# Patient Record
Sex: MALE | Race: ASIAN | NOT HISPANIC OR LATINO | Employment: FULL TIME | URBAN - METROPOLITAN AREA
[De-identification: names, ages, dates, MRNs, and addresses within clinical notes are randomized per-mention and may not be internally consistent; named-entity substitution may affect disease eponyms.]

---

## 2017-03-03 ENCOUNTER — APPOINTMENT (OUTPATIENT)
Dept: RADIOLOGY | Facility: MEDICAL CENTER | Age: 62
End: 2017-03-03
Attending: EMERGENCY MEDICINE
Payer: COMMERCIAL

## 2017-03-03 ENCOUNTER — HOSPITAL ENCOUNTER (OUTPATIENT)
Facility: MEDICAL CENTER | Age: 62
End: 2017-03-04
Attending: EMERGENCY MEDICINE | Admitting: INTERNAL MEDICINE
Payer: COMMERCIAL

## 2017-03-03 ENCOUNTER — RESOLUTE PROFESSIONAL BILLING HOSPITAL PROF FEE (OUTPATIENT)
Dept: MEDSURG UNIT | Facility: MEDICAL CENTER | Age: 62
End: 2017-03-03
Payer: COMMERCIAL

## 2017-03-03 DIAGNOSIS — R47.01 APHASIA: ICD-10-CM

## 2017-03-03 DIAGNOSIS — G45.8 OTHER SPECIFIED TRANSIENT CEREBRAL ISCHEMIAS: ICD-10-CM

## 2017-03-03 PROBLEM — G45.9 TIA (TRANSIENT ISCHEMIC ATTACK): Status: ACTIVE | Noted: 2017-03-03

## 2017-03-03 LAB
ABO GROUP BLD: NORMAL
ABO GROUP BLD: NORMAL
ALBUMIN SERPL BCP-MCNC: 4.2 G/DL (ref 3.2–4.9)
ALBUMIN/GLOB SERPL: 1.4 G/DL
ALP SERPL-CCNC: 65 U/L (ref 30–99)
ALT SERPL-CCNC: 20 U/L (ref 2–50)
ANION GAP SERPL CALC-SCNC: 6 MMOL/L (ref 0–11.9)
APPEARANCE UR: CLEAR
APTT PPP: 26.2 SEC (ref 24.7–36)
AST SERPL-CCNC: 21 U/L (ref 12–45)
BASOPHILS # BLD AUTO: 0.9 % (ref 0–1.8)
BASOPHILS # BLD: 0.06 K/UL (ref 0–0.12)
BILIRUB SERPL-MCNC: 0.6 MG/DL (ref 0.1–1.5)
BILIRUB UR QL STRIP.AUTO: NEGATIVE
BLD GP AB SCN SERPL QL: NORMAL
BUN SERPL-MCNC: 19 MG/DL (ref 8–22)
CALCIUM SERPL-MCNC: 9.7 MG/DL (ref 8.5–10.5)
CHLORIDE SERPL-SCNC: 106 MMOL/L (ref 96–112)
CO2 SERPL-SCNC: 29 MMOL/L (ref 20–33)
COLOR UR: COLORLESS
CREAT SERPL-MCNC: 1.13 MG/DL (ref 0.5–1.4)
CULTURE IF INDICATED INDCX: NO UA CULTURE
EKG IMPRESSION: NORMAL
EOSINOPHIL # BLD AUTO: 0.21 K/UL (ref 0–0.51)
EOSINOPHIL NFR BLD: 3.1 % (ref 0–6.9)
ERYTHROCYTE [DISTWIDTH] IN BLOOD BY AUTOMATED COUNT: 38.1 FL (ref 35.9–50)
GFR SERPL CREATININE-BSD FRML MDRD: >60 ML/MIN/1.73 M 2
GLOBULIN SER CALC-MCNC: 2.9 G/DL (ref 1.9–3.5)
GLUCOSE SERPL-MCNC: 93 MG/DL (ref 65–99)
GLUCOSE UR STRIP.AUTO-MCNC: NEGATIVE MG/DL
HCT VFR BLD AUTO: 45.6 % (ref 42–52)
HGB BLD-MCNC: 15.3 G/DL (ref 14–18)
IMM GRANULOCYTES # BLD AUTO: 0.02 K/UL (ref 0–0.11)
IMM GRANULOCYTES NFR BLD AUTO: 0.3 % (ref 0–0.9)
INR PPP: 0.94 (ref 0.87–1.13)
KETONES UR STRIP.AUTO-MCNC: NEGATIVE MG/DL
LEUKOCYTE ESTERASE UR QL STRIP.AUTO: NEGATIVE
LYMPHOCYTES # BLD AUTO: 1.84 K/UL (ref 1–4.8)
LYMPHOCYTES NFR BLD: 26.8 % (ref 22–41)
MCH RBC QN AUTO: 28.1 PG (ref 27–33)
MCHC RBC AUTO-ENTMCNC: 33.6 G/DL (ref 33.7–35.3)
MCV RBC AUTO: 83.7 FL (ref 81.4–97.8)
MICRO URNS: NORMAL
MONOCYTES # BLD AUTO: 0.63 K/UL (ref 0–0.85)
MONOCYTES NFR BLD AUTO: 9.2 % (ref 0–13.4)
NEUTROPHILS # BLD AUTO: 4.11 K/UL (ref 1.82–7.42)
NEUTROPHILS NFR BLD: 59.7 % (ref 44–72)
NITRITE UR QL STRIP.AUTO: NEGATIVE
NRBC # BLD AUTO: 0 K/UL
NRBC BLD AUTO-RTO: 0 /100 WBC
PH UR STRIP.AUTO: 7 [PH]
PLATELET # BLD AUTO: 247 K/UL (ref 164–446)
PMV BLD AUTO: 9.2 FL (ref 9–12.9)
POTASSIUM SERPL-SCNC: 4 MMOL/L (ref 3.6–5.5)
PROT SERPL-MCNC: 7.1 G/DL (ref 6–8.2)
PROT UR QL STRIP: NEGATIVE MG/DL
PROTHROMBIN TIME: 12.9 SEC (ref 12–14.6)
RBC # BLD AUTO: 5.45 M/UL (ref 4.7–6.1)
RBC UR QL AUTO: NEGATIVE
RH BLD: NORMAL
SODIUM SERPL-SCNC: 141 MMOL/L (ref 135–145)
SP GR UR STRIP.AUTO: 1.01
TROPONIN I SERPL-MCNC: <0.01 NG/ML (ref 0–0.04)
WBC # BLD AUTO: 6.9 K/UL (ref 4.8–10.8)

## 2017-03-03 PROCEDURE — 700102 HCHG RX REV CODE 250 W/ 637 OVERRIDE(OP): Performed by: INTERNAL MEDICINE

## 2017-03-03 PROCEDURE — 86850 RBC ANTIBODY SCREEN: CPT

## 2017-03-03 PROCEDURE — 700105 HCHG RX REV CODE 258: Performed by: INTERNAL MEDICINE

## 2017-03-03 PROCEDURE — 70496 CT ANGIOGRAPHY HEAD: CPT

## 2017-03-03 PROCEDURE — 99285 EMERGENCY DEPT VISIT HI MDM: CPT

## 2017-03-03 PROCEDURE — 93005 ELECTROCARDIOGRAM TRACING: CPT | Performed by: EMERGENCY MEDICINE

## 2017-03-03 PROCEDURE — 36415 COLL VENOUS BLD VENIPUNCTURE: CPT

## 2017-03-03 PROCEDURE — 70498 CT ANGIOGRAPHY NECK: CPT

## 2017-03-03 PROCEDURE — 700117 HCHG RX CONTRAST REV CODE 255: Performed by: EMERGENCY MEDICINE

## 2017-03-03 PROCEDURE — 94760 N-INVAS EAR/PLS OXIMETRY 1: CPT

## 2017-03-03 PROCEDURE — 85610 PROTHROMBIN TIME: CPT

## 2017-03-03 PROCEDURE — 80053 COMPREHEN METABOLIC PANEL: CPT

## 2017-03-03 PROCEDURE — 86900 BLOOD TYPING SEROLOGIC ABO: CPT

## 2017-03-03 PROCEDURE — G0378 HOSPITAL OBSERVATION PER HR: HCPCS

## 2017-03-03 PROCEDURE — A9270 NON-COVERED ITEM OR SERVICE: HCPCS | Performed by: INTERNAL MEDICINE

## 2017-03-03 PROCEDURE — 81003 URINALYSIS AUTO W/O SCOPE: CPT

## 2017-03-03 PROCEDURE — 86901 BLOOD TYPING SEROLOGIC RH(D): CPT

## 2017-03-03 PROCEDURE — 85730 THROMBOPLASTIN TIME PARTIAL: CPT

## 2017-03-03 PROCEDURE — 84484 ASSAY OF TROPONIN QUANT: CPT

## 2017-03-03 PROCEDURE — 85025 COMPLETE CBC W/AUTO DIFF WBC: CPT

## 2017-03-03 PROCEDURE — 96360 HYDRATION IV INFUSION INIT: CPT

## 2017-03-03 PROCEDURE — 70450 CT HEAD/BRAIN W/O DYE: CPT

## 2017-03-03 PROCEDURE — 71010 DX-CHEST-PORTABLE (1 VIEW): CPT

## 2017-03-03 RX ORDER — POLYETHYLENE GLYCOL 3350 17 G/17G
1 POWDER, FOR SOLUTION ORAL
Status: DISCONTINUED | OUTPATIENT
Start: 2017-03-03 | End: 2017-03-04 | Stop reason: HOSPADM

## 2017-03-03 RX ORDER — IBUPROFEN 200 MG
950 CAPSULE ORAL DAILY
COMMUNITY

## 2017-03-03 RX ORDER — M-VIT,TX,IRON,MINS/CALC/FOLIC 27MG-0.4MG
1 TABLET ORAL DAILY
Status: DISCONTINUED | OUTPATIENT
Start: 2017-03-04 | End: 2017-03-04 | Stop reason: HOSPADM

## 2017-03-03 RX ORDER — SODIUM CHLORIDE 9 MG/ML
INJECTION, SOLUTION INTRAVENOUS CONTINUOUS
Status: DISCONTINUED | OUTPATIENT
Start: 2017-03-03 | End: 2017-03-04 | Stop reason: HOSPADM

## 2017-03-03 RX ORDER — ATORVASTATIN CALCIUM 20 MG/1
40 TABLET, FILM COATED ORAL EVERY MORNING
Status: DISCONTINUED | OUTPATIENT
Start: 2017-03-04 | End: 2017-03-03

## 2017-03-03 RX ORDER — ASPIRIN 300 MG/1
325 SUPPOSITORY RECTAL ONCE
Status: DISCONTINUED | OUTPATIENT
Start: 2017-03-03 | End: 2017-03-03

## 2017-03-03 RX ORDER — BISACODYL 10 MG
10 SUPPOSITORY, RECTAL RECTAL
Status: DISCONTINUED | OUTPATIENT
Start: 2017-03-03 | End: 2017-03-04 | Stop reason: HOSPADM

## 2017-03-03 RX ORDER — LOSARTAN POTASSIUM 50 MG/1
50 TABLET ORAL EVERY MORNING
Status: DISCONTINUED | OUTPATIENT
Start: 2017-03-04 | End: 2017-03-03

## 2017-03-03 RX ORDER — LOSARTAN POTASSIUM 50 MG/1
50 TABLET ORAL EVERY MORNING
COMMUNITY

## 2017-03-03 RX ORDER — AMOXICILLIN 250 MG
2 CAPSULE ORAL 2 TIMES DAILY
Status: DISCONTINUED | OUTPATIENT
Start: 2017-03-03 | End: 2017-03-04 | Stop reason: HOSPADM

## 2017-03-03 RX ORDER — IBUPROFEN 200 MG
950 CAPSULE ORAL DAILY
Status: DISCONTINUED | OUTPATIENT
Start: 2017-03-04 | End: 2017-03-04 | Stop reason: HOSPADM

## 2017-03-03 RX ORDER — MIRTAZAPINE 45 MG/1
45 TABLET, FILM COATED ORAL NIGHTLY
COMMUNITY

## 2017-03-03 RX ORDER — ASPIRIN 300 MG/1
300 SUPPOSITORY RECTAL ONCE
Status: COMPLETED | OUTPATIENT
Start: 2017-03-03 | End: 2017-03-03

## 2017-03-03 RX ORDER — ATORVASTATIN CALCIUM 80 MG/1
80 TABLET, FILM COATED ORAL
Status: DISCONTINUED | OUTPATIENT
Start: 2017-03-03 | End: 2017-03-04 | Stop reason: HOSPADM

## 2017-03-03 RX ORDER — ATORVASTATIN CALCIUM 40 MG/1
40 TABLET, FILM COATED ORAL EVERY MORNING
Status: ON HOLD | COMMUNITY
End: 2017-03-04

## 2017-03-03 RX ADMIN — ASPIRIN 300 MG: 300 SUPPOSITORY RECTAL at 22:26

## 2017-03-03 RX ADMIN — IOHEXOL 80 ML: 350 INJECTION, SOLUTION INTRAVENOUS at 19:51

## 2017-03-03 RX ADMIN — MIRTAZAPINE 45 MG: 15 TABLET, FILM COATED ORAL at 23:34

## 2017-03-03 RX ADMIN — ATORVASTATIN CALCIUM 80 MG: 80 TABLET, FILM COATED ORAL at 23:34

## 2017-03-03 RX ADMIN — SODIUM CHLORIDE: 9 INJECTION, SOLUTION INTRAVENOUS at 23:38

## 2017-03-03 ASSESSMENT — ENCOUNTER SYMPTOMS
MYALGIAS: 0
FEVER: 0
BLURRED VISION: 0
PALPITATIONS: 0
DIARRHEA: 0
COUGH: 0
SHORTNESS OF BREATH: 0
VOMITING: 0
SENSORY CHANGE: 0
CHILLS: 0
DIZZINESS: 0
SPEECH CHANGE: 1
ABDOMINAL PAIN: 0
HEADACHES: 1
NERVOUS/ANXIOUS: 1
FOCAL WEAKNESS: 0
LOSS OF CONSCIOUSNESS: 0
NAUSEA: 0

## 2017-03-03 ASSESSMENT — LIFESTYLE VARIABLES
ALCOHOL_USE: NO
SUBSTANCE_ABUSE: 0
EVER_SMOKED: NEVER
DO YOU DRINK ALCOHOL: NO

## 2017-03-03 ASSESSMENT — PAIN SCALES - GENERAL
PAINLEVEL_OUTOF10: 0
PAINLEVEL_OUTOF10: 0

## 2017-03-03 NOTE — IP AVS SNAPSHOT
" Home Care Instructions                                                                                                                  Name:Rosalee Santamaria  Medical Record Number:[id number]  CSN: [id number]    YOB: 1955   Age: 61 y.o.  Sex: male  HT:1.854 m (6' 1\") WT: 76.2 kg (167 lb 15.9 oz)          Admit Date: 3/3/2017     Discharge Date:   Today's Date: 3/4/2017  Attending Doctor:  No att. providers found                  Allergies:  Review of patient's allergies indicates no known allergies.            Discharge Instructions       Discharge Instructions    Discharged to home by car with relative. Discharged via wheelchair, hospital escort: Refused.  Special equipment needed: Not Applicable    Be sure to schedule a follow-up appointment with your primary care doctor or any specialists as instructed.     Discharge Plan:   Diet Plan: Discussed  Activity Level: Discussed  Confirmed Follow up Appointment: Patient to Call and Schedule Appointment  Confirmed Symptoms Management: Discussed  Medication Reconciliation Updated: Yes  Influenza Vaccine Indication: Not indicated: Previously immunized this influenza season and > 8 years of age    I understand that a diet low in cholesterol, fat, and sodium is recommended for good health. Unless I have been given specific instructions below for another diet, I accept this instruction as my diet prescription.   Other diet: heart healthy    Special Instructions: None    · Is patient discharged on Warfarin / Coumadin?   No     · Is patient Post Blood Transfusion?  No    Depression / Suicide Risk    As you are discharged from this [facility] facility, it is important to learn how to keep safe from harming yourself.    Recognize the warning signs:  · Abrupt changes in personality, positive or negative- including increase in energy   · Giving away possessions  · Change in eating patterns- significant weight changes-  positive or negative  · Change in sleeping " patterns- unable to sleep or sleeping all the time   · Unwillingness or inability to communicate  · Depression  · Unusual sadness, discouragement and loneliness  · Talk of wanting to die  · Neglect of personal appearance   · Rebelliousness- reckless behavior  · Withdrawal from people/activities they love  · Confusion- inability to concentrate     If you or a loved one observes any of these behaviors or has concerns about self-harm, here's what you can do:  · Talk about it- your feelings and reasons for harming yourself  · Remove any means that you might use to hurt yourself (examples: pills, rope, extension cords, firearm)  · Get professional help from the community (Mental Health, Substance Abuse, psychological counseling)  · Do not be alone:Call your Safe Contact- someone whom you trust who will be there for you.  · Call your local CRISIS HOTLINE 869-4503 or 949-991-3341  · Call your local Children's Mobile Crisis Response Team [place] (244) 189-3810 or www.NetMinder  · Call the toll free National Suicide Prevention Hotlines   · National Suicide Prevention Lifeline 571-165-DJNI (4815)  · National Hope Line Network 800-SUICIDE (775-0673)           Discharge Medication Instructions:    Below are the medications your physician expects you to take upon discharge:    Review all your home medications and newly ordered medications with your doctor and/or pharmacist. Follow medication instructions as directed by your doctor and/or pharmacist.    Please keep your medication list with you and share with your physician.               Medication List      START taking these medications        Instructions    clopidogrel 75 MG Tabs   Commonly known as:  PLAVIX    Take 1 Tab by mouth every day.   Dose:  75 mg         CHANGE how you take these medications        Instructions    atorvastatin 80 MG tablet   What changed:    - medication strength  - how much to take  - when to take this   Last time this was given:  80  mg on 3/3/2017 11:34 PM   Commonly known as:  LIPITOR    Take 1 Tab by mouth every bedtime.   Dose:  80 mg         CONTINUE taking these medications        Instructions    calcium citrate 950 MG Tabs   Last time this was given:  950 mg on 3/4/2017 10:22 AM   Commonly known as:  CALCITRATE    Take 950 mg by mouth every day.   Dose:  950 mg       losartan 50 MG Tabs   Commonly known as:  COZAAR    Take 50 mg by mouth every morning.   Dose:  50 mg       mirtazapine 45 MG tablet   Last time this was given:  45 mg on 3/3/2017 11:34 PM   Commonly known as:  REMERON    Take 45 mg by mouth every evening.   Dose:  45 mg       OCUVITE Tabs   Last time this was given:  1 Tab on 3/4/2017 10:22 AM    Take 1 Tab by mouth every day.   Dose:  1 Tab         STOP taking these medications     aspirin EC 81 MG Tbec   Commonly known as:  ECOTRIN               Instructions           Diet / Nutrition:    Follow any diet instructions given to you by your doctor or the dietician, including how much salt (sodium) you are allowed each day.    If you are overweight, talk to your doctor about a weight reduction plan.    Activity:    Remain physically active following your doctor's instructions about exercise and activity.    Rest often.     Any time you become even a little tired or short of breath, SIT DOWN and rest.    Worsening Symptoms:    Report any of the following signs and symptoms to the doctor's office immediately:    *Pain of jaw, arm, or neck  *Chest pain not relieved by medication                               *Dizziness or loss of consciousness  *Difficulty breathing even when at rest   *More tired than usual                                       *Bleeding drainage or swelling of surgical site  *Swelling of feet, ankles, legs or stomach                 *Fever (>100ºF)  *Pink or blood tinged sputum  *Weight gain (3lbs/day or 5lbs /week)           *Shock from internal defibrillator (if applicable)  *Palpitations or irregular  heartbeats                *Cool and/or numb extremities    Stroke Awareness    Common Risk Factors for Stroke include:    Age  Atrial Fibrillation  Carotid Artery Stenosis  Diabetes Mellitus  Excessive alcohol consumption  High blood pressure  Overweight   Physical inactivity  Smoking    Warning signs and symptoms of a stroke include:    *Sudden numbness or weakness of the face, arm or leg (especially on one side of the body).  *Sudden confusion, trouble speaking or understanding.  *Sudden trouble seeing in one or both eyes.  *Sudden trouble walking, dizziness, loss of balance or coordination.Sudden severe headache with no known cause.    It is very important to get treatment quickly when a stroke occurs. If you experience any of the above warning signs, call 911 immediately.                   Disclaimer         Quit Smoking / Tobacco Use:    I understand the use of any tobacco products increases my chance of suffering from future heart disease or stroke and could cause other illnesses which may shorten my life. Quitting the use of tobacco products is the single most important thing I can do to improve my health. For further information on smoking / tobacco cessation call a Toll Free Quit Line at 1-319.985.2129 (*[employer]) or 1-525.775.4754 (American Lung Association) or you can access the web based program at www.lungusa.org.    [place] Tobacco Users Help Line:  (704) 878-7961       Toll Free: 1-733.647.8695  Quit Tobacco Program [facility] (599)908-9680    Crisis Hotline:    [place] Crisis Hotline:  6-320-XBIZNDR or 1-658.841.5739    [place] Crisis Hotline:    1-202.930.9923 or 542-593-0264    Discharge Survey:   Thank you for choosing [facility]. We hope we did everything we could to make your hospital stay a pleasant one. You may be receiving a phone survey and we would appreciate your time and participation in answering the questions. Your input is very valuable to us  in our efforts to improve our service to our patients and their families.        My signature on this form indicates that:    1. I have reviewed and understand the above information.  2. My questions regarding this information have been answered to my satisfaction.  3. I have formulated a plan with my discharge nurse to obtain my prescribed medications for home.                  Disclaimer         __________________________________                     __________       ________                       Patient Signature                                                 Date                    Time

## 2017-03-04 ENCOUNTER — APPOINTMENT (OUTPATIENT)
Dept: RADIOLOGY | Facility: MEDICAL CENTER | Age: 62
End: 2017-03-04
Attending: INTERNAL MEDICINE
Payer: COMMERCIAL

## 2017-03-04 VITALS
OXYGEN SATURATION: 95 % | BODY MASS INDEX: 22.26 KG/M2 | RESPIRATION RATE: 12 BRPM | SYSTOLIC BLOOD PRESSURE: 128 MMHG | HEIGHT: 73 IN | DIASTOLIC BLOOD PRESSURE: 81 MMHG | TEMPERATURE: 98 F | HEART RATE: 63 BPM | WEIGHT: 167.99 LBS

## 2017-03-04 PROBLEM — E78.5 DYSLIPIDEMIA: Status: ACTIVE | Noted: 2017-03-04

## 2017-03-04 PROBLEM — I10 HYPERTENSION: Status: ACTIVE | Noted: 2017-03-04

## 2017-03-04 LAB
ALBUMIN SERPL BCP-MCNC: 3.5 G/DL (ref 3.2–4.9)
ALBUMIN/GLOB SERPL: 1.3 G/DL
ALP SERPL-CCNC: 56 U/L (ref 30–99)
ALT SERPL-CCNC: 16 U/L (ref 2–50)
ANION GAP SERPL CALC-SCNC: 8 MMOL/L (ref 0–11.9)
AST SERPL-CCNC: 17 U/L (ref 12–45)
BASOPHILS # BLD AUTO: 0.7 % (ref 0–1.8)
BASOPHILS # BLD: 0.05 K/UL (ref 0–0.12)
BILIRUB SERPL-MCNC: 0.7 MG/DL (ref 0.1–1.5)
BNP SERPL-MCNC: 10 PG/ML (ref 0–100)
BUN SERPL-MCNC: 18 MG/DL (ref 8–22)
CALCIUM SERPL-MCNC: 8.9 MG/DL (ref 8.5–10.5)
CHLORIDE SERPL-SCNC: 107 MMOL/L (ref 96–112)
CHOLEST SERPL-MCNC: 111 MG/DL (ref 100–199)
CO2 SERPL-SCNC: 26 MMOL/L (ref 20–33)
CREAT SERPL-MCNC: 1.08 MG/DL (ref 0.5–1.4)
DEPRECATED D DIMER PPP IA-ACNC: <200 NG/ML(D-DU)
EKG IMPRESSION: NORMAL
EOSINOPHIL # BLD AUTO: 0.15 K/UL (ref 0–0.51)
EOSINOPHIL NFR BLD: 2 % (ref 0–6.9)
ERYTHROCYTE [DISTWIDTH] IN BLOOD BY AUTOMATED COUNT: 37.9 FL (ref 35.9–50)
ERYTHROCYTE [SEDIMENTATION RATE] IN BLOOD BY WESTERGREN METHOD: 3 MM/HOUR (ref 0–20)
EST. AVERAGE GLUCOSE BLD GHB EST-MCNC: 123 MG/DL
GFR SERPL CREATININE-BSD FRML MDRD: >60 ML/MIN/1.73 M 2
GLOBULIN SER CALC-MCNC: 2.8 G/DL (ref 1.9–3.5)
GLUCOSE SERPL-MCNC: 144 MG/DL (ref 65–99)
HBA1C MFR BLD: 5.9 % (ref 0–5.6)
HCT VFR BLD AUTO: 43.4 % (ref 42–52)
HDLC SERPL-MCNC: 36 MG/DL
HGB BLD-MCNC: 14.8 G/DL (ref 14–18)
IMM GRANULOCYTES # BLD AUTO: 0.02 K/UL (ref 0–0.11)
IMM GRANULOCYTES NFR BLD AUTO: 0.3 % (ref 0–0.9)
LDLC SERPL CALC-MCNC: 57 MG/DL
LV EJECT FRACT  99904: 65
LV EJECT FRACT MOD 2C 99903: 60.5
LV EJECT FRACT MOD 4C 99902: 54.37
LV EJECT FRACT MOD BP 99901: 56.78
LYMPHOCYTES # BLD AUTO: 1.61 K/UL (ref 1–4.8)
LYMPHOCYTES NFR BLD: 21.3 % (ref 22–41)
MAGNESIUM SERPL-MCNC: 2.1 MG/DL (ref 1.5–2.5)
MCH RBC QN AUTO: 28.3 PG (ref 27–33)
MCHC RBC AUTO-ENTMCNC: 34.1 G/DL (ref 33.7–35.3)
MCV RBC AUTO: 83 FL (ref 81.4–97.8)
MONOCYTES # BLD AUTO: 0.59 K/UL (ref 0–0.85)
MONOCYTES NFR BLD AUTO: 7.8 % (ref 0–13.4)
NEUTROPHILS # BLD AUTO: 5.15 K/UL (ref 1.82–7.42)
NEUTROPHILS NFR BLD: 67.9 % (ref 44–72)
NRBC # BLD AUTO: 0 K/UL
NRBC BLD AUTO-RTO: 0 /100 WBC
PLATELET # BLD AUTO: 234 K/UL (ref 164–446)
PMV BLD AUTO: 9.1 FL (ref 9–12.9)
POTASSIUM SERPL-SCNC: 3.6 MMOL/L (ref 3.6–5.5)
PROT SERPL-MCNC: 6.3 G/DL (ref 6–8.2)
RBC # BLD AUTO: 5.23 M/UL (ref 4.7–6.1)
SODIUM SERPL-SCNC: 141 MMOL/L (ref 135–145)
TRIGL SERPL-MCNC: 89 MG/DL (ref 0–149)
TROPONIN I SERPL-MCNC: <0.01 NG/ML (ref 0–0.04)
WBC # BLD AUTO: 7.6 K/UL (ref 4.8–10.8)

## 2017-03-04 PROCEDURE — 80061 LIPID PANEL: CPT

## 2017-03-04 PROCEDURE — G0378 HOSPITAL OBSERVATION PER HR: HCPCS

## 2017-03-04 PROCEDURE — 85379 FIBRIN DEGRADATION QUANT: CPT

## 2017-03-04 PROCEDURE — 93005 ELECTROCARDIOGRAM TRACING: CPT | Performed by: INTERNAL MEDICINE

## 2017-03-04 PROCEDURE — 700102 HCHG RX REV CODE 250 W/ 637 OVERRIDE(OP): Performed by: INTERNAL MEDICINE

## 2017-03-04 PROCEDURE — G8978 MOBILITY CURRENT STATUS: HCPCS | Mod: CI

## 2017-03-04 PROCEDURE — 97161 PT EVAL LOW COMPLEX 20 MIN: CPT

## 2017-03-04 PROCEDURE — 93306 TTE W/DOPPLER COMPLETE: CPT | Mod: 26 | Performed by: INTERNAL MEDICINE

## 2017-03-04 PROCEDURE — 83036 HEMOGLOBIN GLYCOSYLATED A1C: CPT

## 2017-03-04 PROCEDURE — G8988 SELF CARE GOAL STATUS: HCPCS | Mod: CI

## 2017-03-04 PROCEDURE — 36415 COLL VENOUS BLD VENIPUNCTURE: CPT

## 2017-03-04 PROCEDURE — G8989 SELF CARE D/C STATUS: HCPCS | Mod: CI

## 2017-03-04 PROCEDURE — 700111 HCHG RX REV CODE 636 W/ 250 OVERRIDE (IP): Performed by: INTERNAL MEDICINE

## 2017-03-04 PROCEDURE — 84484 ASSAY OF TROPONIN QUANT: CPT

## 2017-03-04 PROCEDURE — 93010 ELECTROCARDIOGRAM REPORT: CPT | Performed by: INTERNAL MEDICINE

## 2017-03-04 PROCEDURE — 96372 THER/PROPH/DIAG INJ SC/IM: CPT

## 2017-03-04 PROCEDURE — 83735 ASSAY OF MAGNESIUM: CPT

## 2017-03-04 PROCEDURE — 96361 HYDRATE IV INFUSION ADD-ON: CPT

## 2017-03-04 PROCEDURE — 93306 TTE W/DOPPLER COMPLETE: CPT

## 2017-03-04 PROCEDURE — 85652 RBC SED RATE AUTOMATED: CPT

## 2017-03-04 PROCEDURE — 80053 COMPREHEN METABOLIC PANEL: CPT

## 2017-03-04 PROCEDURE — G8987 SELF CARE CURRENT STATUS: HCPCS | Mod: CI

## 2017-03-04 PROCEDURE — A9270 NON-COVERED ITEM OR SERVICE: HCPCS | Performed by: INTERNAL MEDICINE

## 2017-03-04 PROCEDURE — 83880 ASSAY OF NATRIURETIC PEPTIDE: CPT

## 2017-03-04 PROCEDURE — G8979 MOBILITY GOAL STATUS: HCPCS | Mod: CI

## 2017-03-04 PROCEDURE — 70551 MRI BRAIN STEM W/O DYE: CPT

## 2017-03-04 PROCEDURE — G8980 MOBILITY D/C STATUS: HCPCS | Mod: CI

## 2017-03-04 PROCEDURE — 85025 COMPLETE CBC W/AUTO DIFF WBC: CPT

## 2017-03-04 PROCEDURE — 99220 PR INITIAL OBSERVATION CARE,LEVL III: CPT | Mod: GC | Performed by: INTERNAL MEDICINE

## 2017-03-04 PROCEDURE — 97165 OT EVAL LOW COMPLEX 30 MIN: CPT

## 2017-03-04 RX ORDER — ASPIRIN AND DIPYRIDAMOLE 25; 200 MG/1; MG/1
1 CAPSULE, EXTENDED RELEASE ORAL 2 TIMES DAILY
Qty: 60 CAP | Refills: 0 | Status: SHIPPED | OUTPATIENT
Start: 2017-03-04 | End: 2017-03-04

## 2017-03-04 RX ORDER — CLOPIDOGREL BISULFATE 75 MG/1
75 TABLET ORAL DAILY
Qty: 30 TAB | Refills: 0 | Status: SHIPPED | OUTPATIENT
Start: 2017-03-04

## 2017-03-04 RX ORDER — ATORVASTATIN CALCIUM 80 MG/1
80 TABLET, FILM COATED ORAL
Qty: 30 TAB | Refills: 0 | Status: SHIPPED | OUTPATIENT
Start: 2017-03-04

## 2017-03-04 RX ADMIN — CALCIUM CITRATE 200 MG (950 MG) TABLET 950 MG: at 10:22

## 2017-03-04 RX ADMIN — ENOXAPARIN SODIUM 40 MG: 100 INJECTION SUBCUTANEOUS at 10:22

## 2017-03-04 RX ADMIN — MULTIPLE VITAMINS W/ MINERALS TAB 1 TABLET: TAB at 10:22

## 2017-03-04 RX ADMIN — ASPIRIN 81 MG: 81 TABLET ORAL at 10:22

## 2017-03-04 ASSESSMENT — COPD QUESTIONNAIRES
COPD SCREENING SCORE: 0
DO YOU EVER COUGH UP ANY MUCUS OR PHLEGM?: NO/ONLY WITH OCCASIONAL COLDS OR INFECTIONS
HAVE YOU SMOKED AT LEAST 100 CIGARETTES IN YOUR ENTIRE LIFE: NO/DON'T KNOW
DURING THE PAST 4 WEEKS HOW MUCH DID YOU FEEL SHORT OF BREATH: NONE/LITTLE OF THE TIME
HAVE YOU SMOKED AT LEAST 100 CIGARETTES IN YOUR ENTIRE LIFE: NO/DON'T KNOW
COPD SCREENING SCORE: 0
DO YOU EVER COUGH UP ANY MUCUS OR PHLEGM?: NO/ONLY WITH OCCASIONAL COLDS OR INFECTIONS
DURING THE PAST 4 WEEKS HOW MUCH DID YOU FEEL SHORT OF BREATH: NONE/LITTLE OF THE TIME

## 2017-03-04 ASSESSMENT — PAIN SCALES - GENERAL
PAINLEVEL_OUTOF10: 0
PAINLEVEL_OUTOF10: 0

## 2017-03-04 ASSESSMENT — ACTIVITIES OF DAILY LIVING (ADL): TOILETING: INDEPENDENT

## 2017-03-04 ASSESSMENT — GAIT ASSESSMENTS
GAIT LEVEL OF ASSIST: SUPERVISED
DISTANCE (FEET): 150

## 2017-03-04 ASSESSMENT — LIFESTYLE VARIABLES
DO YOU DRINK ALCOHOL: NO
EVER_SMOKED: NEVER

## 2017-03-04 NOTE — ED NOTES
Pt BIB EMS from urgent care. Pt reports at 1600 he developed slurred speech and confusion. Symptoms improved but are now increasing again. Denies numbness and tingling. Equal , no facial droop.

## 2017-03-04 NOTE — SENIOR ADMIT NOTE
"[facility] [place] [facility] SENIOR ADMIT NOTE:  Samira Abdi, PGY-3    Patient ID:   Name:             Rosalee Santamaria   YOB: 1955  Age:                 61 y.o.  male   MRN:               [id number]                                                          Chief Complaint:       Difficulty with speech today    History of Present Illness:    Rosalee Santamaria is a 61 y.o. male with a PMhx of hypertension, dyslipidemia who presented with history of difficultly with speech and annunciating thoughts with slurring speech this afternoon. Patient describes this event as \"he could not say what he wanted to say\" and was getting frustrated with this; speech was garbled. He thinks it lasted a few minutes and thinks it got better for some time before it again appeared and was worse. He also felt anxious associated with this episode of difficulty with speech. This lasted a few hours, and resolved while patient was being evaluated by neurologist here in the ER. He denies any previous history of TIA or stroke. Denies any headaches, blurry vision, weakness, sensory changes. Denies any recent illness, fever, chills, nausea, vomiting, chest pain, shortness of breath.      Physical Exam:   Constitutional: Well developed, Well nourished, No acute distress  Cardiovascular: Normal heart rate, Normal rhythm, No murmurs heard  Lungs:  Respiratory effort is normal. Normal breath sounds, breath sounds clear to auscultation bilaterally, no rales, no rhonchi, no wheezing.   Abdomen: Bowel sounds normal, Soft, No tenderness   Skin: Warm, Dry, No erythema, No rash     Neurologic: Alert & oriented x 3, Normal motor function, Normal sensory function, No focal deficits noted, cranial nerves II through XII are normal  Extremities: no pedal edema, intact distal pulses        Assessment/Plan:     Transient ischemic attack  Head CT negative for acute stroke or bleed  On presentation patient initially was found to have garbled speech by the ER " doctor, however when seen by neurologist later his deficits had improved.  CTA head and neck ordered per neurologist which were normal  MRI brain and echo pending  EKG consistent with T-wave inversion in inferior leads, V4 and V5. Trop negative x 2. Repeat EKG pending  Patient given 300 mg rectal aspirin, continued on daily baby aspirin, and increased his atorvastatin to high dose  Diet order s/p bedside test  PT/OT and speech consulted. Q4 neuro checks       Please refer to [facility] for complete admission details.

## 2017-03-04 NOTE — ASSESSMENT & PLAN NOTE
- Expressive aphasia x approx 3 hrs, resolved. NIHSS score 0.  ABCD 2 score is 5 points, moderate risk (age greater than 60, hypertension, slurred speech, duration of symptoms more than 60 minutes)  - Seen by neurology, Dr. Adler, was recommended TIA workup  - CT head is negative for hemorrhage or obvious infarction. CTA neck and head are negative for stenosis  - MRI brain negative for acute stroke.  - Echo EF 65%, normal wall motion and no intracardiac thrombus.    - Aspirin 325 received once in ER.   - passed swallow screen and aphasia resolved.  - switched aspirin to aggrenox on discharge. Increased lipitor to 80 mg qd.   - PT/OT consult placed

## 2017-03-04 NOTE — THERAPY
"Physical Therapy Evaluation completed.   Bed Mobility:  Supine to Sit: Supervised  Transfers: Sit to Stand: Supervised  Gait: Level Of Assist: Supervised with No Equipment Needed       Plan of Care: Patient with no further skilled PT needs in the acute care setting at this time and Patient demonstrates safety with mobility in this environment at this time.   Discharge Recommendations: Equipment: No Equipment Needed. Post-acute therapy recommended after discharged home.    Pt presents to acute PT s/p onset of CVA sx while on vacation with reported slurred speech. Pt presenting today without s/s of any neurologic deficits including WDL strength, control, activity tolerance, and balance. Pt will not require any further skilled acute PT and he reports he feels confident to D/C with assist from wife and family.    See \"Rehab Therapy-Acute\" Patient Summary Report for complete documentation.     "

## 2017-03-04 NOTE — ASSESSMENT & PLAN NOTE
Total cholesterol 111, LDL 57, HDL 36, Triglyceride 89  - increased atorvastatin to 80 mg for TIA.

## 2017-03-04 NOTE — PROGRESS NOTES
Neurology Progress Note        Subjective:  Rosalee feels well this morning.  Back to his usual self.  No issues overnight.  No recurrence of symptoms.    Objective:  Vitals:  Filed Vitals:    03/04/17 0400 03/04/17 0518 03/04/17 0819 03/04/17 1228   BP: 112/66  119/79 125/79   Pulse: 67 70 64 62   Temp: 37.6 °C (99.7 °F)  36.8 °C (98.3 °F) 37.5 °C (99.5 °F)   Resp: 16 16 13 13   Height:       Weight:       SpO2: 93% 96% 96% 97%     General:  Awake, alert and in no apparent distress, cooperative with exam  Mental Status:  Alert, oriented ., speech is fluent, comprehension is intact.  Cranial Nerves:  PERRL, EOMI with no nystagmus, face is symmetric, facial sensation is intact.  No dysarthria.  Motor: 5/5 throughout  Reflexes:  2+ and symmetric with toes downgoing bilaterally  Coordination:  Normal finger to nose bilaterally  Gait:  Deferred    Recent Labs      03/03/17   1800  03/04/17   0335   WBC  6.9  7.6   RBC  5.45  5.23   HEMOGLOBIN  15.3  14.8   HEMATOCRIT  45.6  43.4   MCV  83.7  83.0   MCH  28.1  28.3   RDW  38.1  37.9   PLATELETCT  247  234   MPV  9.2  9.1   NEUTSPOLYS  59.70  67.90   LYMPHOCYTES  26.80  21.30*   MONOCYTES  9.20  7.80   EOSINOPHILS  3.10  2.00   BASOPHILS  0.90  0.70     Recent Labs      03/03/17   1800  03/04/17   0335   SODIUM  141  141   POTASSIUM  4.0  3.6   CHLORIDE  106  107   CO2  29  26   GLUCOSE  93  144*   BUN  19  18     CTA head and neck with no vessel stenosis  TTE:No prior study is available for comparison.   Mild concentric left ventricular hypertrophy.  Normal left ventricular systolic function.  Left ventricular ejection fraction is visually estimated to be 65%.  Grossly normal regional wall motion.  Structurally normal aortic valve without significant stenosis or   regurgitation.  Trace mitral regurgitation.  Upper normal inferior vena cava size with inspiratory collapse.  Unable to estimate pulmonary artery pressure due to an inadequate   tricuspid regurgitant jet.  The  right ventricle was normal in size and function.  Normal pericardium without effusion.    MRI brain:  No acute infarct per my review, mild microvascular ischemic changes with in expected for patient's age.      Impression:   Rosalee is a 61 year old man who had 2-3 hours of expressive aphasia that resolved upon presentation to the ER.  His stroke work up has been negative.  This represents a TIA.  There is a small chance it was a generalized confusional disorder or complicated migraine but the descriptions of symptoms sounded very typical of a TIA.      Recommendations:  1.  Would switch aspirin to Plavix.  2.  Continue statin, LDL at goal.  3.  Blood pressure control.  4.  Discussed importance of exercise and the benefits of mediterranean diet.  5.  Ok to discharge from my standpoint, he should [place] up [place]CP in [place].

## 2017-03-04 NOTE — PROGRESS NOTES
"Pt admitted from ER Red POD by lisseth Ochoa, with PIV on his right  AC patent SL. Walked to his room with no problem. Oriented to his room, call light given. Placed on cardiac monitoring.  /82 mmHg  Pulse 64  Temp(Src) 37.1 °C (98.8 °F)  Resp 20  Ht 1.854 m (6' 1\")  Wt 76.2 kg (167 lb 15.9 oz)  BMI 22.17 kg/m2  SpO2 96% RA.  Admit profile completed. Denies any pain @ this time. MIRANDA, denies any N/T. Dysphagia done, diet ordered. Seizure precaution in placed. Needs attended. Call light within reach. Hourly rounding practiced.    "

## 2017-03-04 NOTE — ED NOTES
Med rec complete per pt and pt's wife at bedside  Pt had a list of his medications on his phone, reviewed  Current medications and last doses with pt  Allergies reviewed - NKDA  No ABX in last month

## 2017-03-04 NOTE — ED NOTES
Unable to perform dysphasia screening in ED.  Aspirin orders changed to rectal per MD.  Pt medicated per mar.

## 2017-03-04 NOTE — THERAPY
Orders received for CSE.  Per RN pt passed dysphagia screen and has been tolerating regular/thin liquid diet with no difficulties.  RN to page SLP if evaluation is appropriate, otherwise will cancel orders.

## 2017-03-04 NOTE — PROGRESS NOTES
Received report from evening RN. Alert and oriented X4. Respirations are even and unlabored. Denies dizziness or lightheadedness at this time. Plan of care reviewed. Verbalizes understanding. Monitors applied. Vital signs stable. Will continue to monitor, call light within reach.

## 2017-03-04 NOTE — THERAPY
"Occupational Therapy Evaluation completed.   Functional Status:  Pt performing ADLs and functional mobility with supervision/MI, BUE 5/5 MMT, GM&FM intact, no significant s/s of fatigue noted, STM &LTM appears intact. Pt will have intermittent assist from family as needed. Pt appears to be back to his baseline functionally and no further acute skilled services warranted.   Plan of Care: Patient with no further skilled OT needs in the acute care setting at this time  Discharge Recommendations:  Equipment: No Equipment Needed. Post-acute therapy recommended after discharged home.    See \"Rehab Therapy-Acute\" Patient Summary Report for complete documentation.    "

## 2017-03-04 NOTE — DISCHARGE SUMMARY
[facility] Discharge Summary      Admit Date:  3/3/2017       Discharge Date:   03/04/2017    Service:   [facility] [place] [facility] Gray Team  Attending Physician(s):   Dr Menezes       Senior Resident(s):   Dr Leiva  Alek Resident(s):   Dr Kennedy      Primary Diagnosis:   Transient ischemic attack     Secondary Diagnoses:           Active Hospital Problems    Diagnosis   • *TIA (transient ischemic attack) [G45.9]   • Hypertension [I10]   • Dyslipidemia [E78.5]       Hospital Summary (Brief Narrative):       A 60 yo M with PMHx of HTN, dyslipidemia and depression admitted for possible TIA. He was visiting [facility] with family from [place]. He came to ER after noticing sudden onset of expressive aphasia without other neurological symptoms which lasted for about 3 hrs and resolved at ER. Neurologist saw him and recommended TIA evaluation. He remains asymptomatic throughout the rest of hospital stay. CT head is negative for hemorrhage or obvious infarction. CTA neck and head are negative for significant stenosis. No significant finding was detected on tele monitoring and echo did not show any intracardiac thrombus. MRI brain was negative for acute stroke. Patient is medically cleared to be discharged after PT/OT evaluation. Aspirin is switched to plavix and atorvastatin is increased from 40 to 80 mg qd. Patient is to follow with his PCP in [place] in a week to monitor new medications side effect and to discuss prolonged monitoring for atrial fibrillation.       Patient /Hospital Summary (Details -- Problem Oriented) :          * TIA (transient ischemic attack)  Assessment & Plan  - Expressive aphasia x approx 3 hrs, resolved. NIHSS score 0.  ABCD 2 score is 5 points, moderate risk (age greater than 60, hypertension, slurred speech, duration of symptoms more than 60 minutes)  - Seen by neurology, Dr. Adler, was recommended TIA workup  - CT head is negative for hemorrhage or obvious  infarction. CTA neck and head are negative for stenosis  - MRI brain negative for acute stroke per neurology (radiology read pending at the time of this note).  - Echo EF 65%, normal wall motion and no intracardiac thrombus.    - Aspirin 325 received once in ER.   - passed swallow screen and aphasia resolved.  - switched aspirin to plavix on discharge. Increased lipitor to 80 mg qd.   - PT/OT consult placed    Hypertension  Assessment & Plan  Continue home losartan on discharge.    Dyslipidemia  Assessment & Plan  Total cholesterol 111, LDL 57, HDL 36, Triglyceride 89  - increased atorvastatin to 80 mg for TIA.        Consultants:     Neurology (Dr Adler)    Procedures:        None    Imaging/ Testing:      ECHOCARDIOGRAM COMP W/O CONT   Final Result      CT-CTA HEAD WITH & W/O-POST PROCESS   Final Result      CT angiogram of the [place] within normal limits.      CT-CTA NECK WITH & W/O-POST PROCESSING   Final Result      CT angiogram of the neck within normal limits.      CT-HEAD W/O   Final Result      1.  White matter lucencies most consistent with small vessel ischemic change versus demyelination or gliosis.      2.  Otherwise, Head CT without contrast with no acute findings. No evidence of acute cerebral infarction, hemorrhage or mass lesion.      DX-CHEST-PORTABLE (1 VIEW)   Final Result      No radiographic evidence of acute cardiopulmonary process.      Costophrenic sulci are excluded from the study      CT-CTA HEAD WITH & W/O-POST PROCESS    (Results Pending)   MR-BRAIN-W/O    (Results Pending)         Discharge Medications:         Medication Reconciliation: Completed     Medication List      START taking these medications       Instructions    clopidogrel 75 MG Tabs   Commonly known as:  PLAVIX    Take 1 Tab by mouth every day.   Dose:  75 mg         CHANGE how you take these medications       Instructions    atorvastatin 80 MG tablet   What changed:    - medication strength  - how much to  take  - when to take this   Last time this was given:  80 mg on 3/3/2017 11:34 PM   Commonly known as:  LIPITOR    Take 1 Tab by mouth every bedtime.   Dose:  80 mg         CONTINUE taking these medications       Instructions    calcium citrate 950 MG Tabs   Last time this was given:  950 mg on 3/4/2017 10:22 AM   Commonly known as:  CALCITRATE    Take 950 mg by mouth every day.   Dose:  950 mg       losartan 50 MG Tabs   Commonly known as:  COZAAR    Take 50 mg by mouth every morning.   Dose:  50 mg       mirtazapine 45 MG tablet   Last time this was given:  45 mg on 3/3/2017 11:34 PM   Commonly known as:  REMERON    Take 45 mg by mouth every evening.   Dose:  45 mg       OCUVITE Tabs   Last time this was given:  1 Tab on 3/4/2017 10:22 AM    Take 1 Tab by mouth every day.   Dose:  1 Tab         STOP taking these medications          aspirin EC 81 MG Tbec   Commonly known as:  ECOTRIN                 Disposition:   home    Diet:   healthy    Activity:   As tolerated, avoid strenuous activity for 1-2 weeks.    Instructions:      Follow up with primary care physician within 1 week to monitor for new medications side effects. (aggrenox and high dose atorvastatin)  The patient was instructed to return to the ER in the event of worsening symptoms. I have counseled the patient on the importance of compliance and the patient has agreed to proceed with all medical recommendations and follow up plan indicated above.   The patient understands that all medications come with benefits and risks. Risks may include permanent injury or death and these risks can be minimized with close reassessment and monitoring.        Primary Care Provider:   Out of state provider in [place]  Discharge summary faxed to primary care provider:  Deferred  Copy of discharge summary given to the patient: Completed    Follow up appointment details :      Follow up with primary care within 1 week    Pending Studies:        Hb A 1 C     Time spent on  discharge day patient visit, preparing discharge paperwork and arranging for patient follow up.    Summary of follow up issues:   - recommend longer atrial fibrillation monitoring as outpatient    Discharge Time (Minutes) :   60 mins      Condition on Discharge    ______________________________________________________________________    Interval history/exam for day of discharge:    Aphasia resolved since admission. Patient felt that he is at baseline state of health. No new neurological deficit.    Filed Vitals:    03/04/17 0400 03/04/17 0518 03/04/17 0819 03/04/17 1228   BP: 112/66  119/79 125/79   Pulse: 67 70 64 62   Temp: 37.6 °C (99.7 °F)  36.8 °C (98.3 °F) 37.5 °C (99.5 °F)   Resp: 16 16 13 13   Height:       Weight:       SpO2: 93% 96% 96% 97%     Weight/BMI: Body mass index is 22.17 kg/(m^2).  Pulse Oximetry: 97 %, O2 (LPM): 0, O2 Delivery: None (Room Air)    General: A & O x 4, no distress.   CVS: S1, S2, RRR, no MRG  PULM: Clear bilaterally  ABD: soft, NT, BS +  EXT: no edema  NEURO: clear speech, CN 2-12 intact, strength, sensation, reflexes are intact.    Most Recent Labs:    Lab Results   Component Value Date/Time    WBC 7.6 03/04/2017 03:35 AM    RBC 5.23 03/04/2017 03:35 AM    HEMOGLOBIN 14.8 03/04/2017 03:35 AM    HEMATOCRIT 43.4 03/04/2017 03:35 AM    MCV 83.0 03/04/2017 03:35 AM    MCH 28.3 03/04/2017 03:35 AM    MCHC 34.1 03/04/2017 03:35 AM    MPV 9.1 03/04/2017 03:35 AM    NEUTROPHILS-POLYS 67.90 03/04/2017 03:35 AM    LYMPHOCYTES 21.30* 03/04/2017 03:35 AM    MONOCYTES 7.80 03/04/2017 03:35 AM    EOSINOPHILS 2.00 03/04/2017 03:35 AM    BASOPHILS 0.70 03/04/2017 03:35 AM      Lab Results   Component Value Date/Time    SODIUM 141 03/04/2017 03:35 AM    POTASSIUM 3.6 03/04/2017 03:35 AM    CHLORIDE 107 03/04/2017 03:35 AM    CO2 26 03/04/2017 03:35 AM    GLUCOSE 144* 03/04/2017 03:35 AM    BUN 18 03/04/2017 03:35 AM    CREATININE 1.08 03/04/2017 03:35 AM      Lab Results   Component Value  Date/Time    ALT(SGPT) 16 03/04/2017 03:35 AM    AST(SGOT) 17 03/04/2017 03:35 AM    ALKALINE PHOSPHATASE 56 03/04/2017 03:35 AM    TOTAL BILIRUBIN 0.7 03/04/2017 03:35 AM    ALBUMIN 3.5 03/04/2017 03:35 AM    GLOBULIN 2.8 03/04/2017 03:35 AM    INR 0.94 03/03/2017 06:00 PM     Lab Results   Component Value Date/Time    PT 12.9 03/03/2017 06:00 PM    INR 0.94 03/03/2017 06:00 PM

## 2017-03-04 NOTE — DISCHARGE PLANNING
Care Transition Team Assessment    Information Source  Orientation : Oriented x 4  Information Given By: Patient  Who is responsible for making decisions for patient? : Patient    Readmission Evaluation  Is this a readmission?: No    Elopement Risk  Legal Hold: No  Ambulatory or Self Mobile in Wheelchair: No-Not an Elopement Risk    Interdisciplinary Discharge Planning  Does Admitting Nurse Feel This Could be a Complex Discharge?: No  Primary Care Physician:  (Dr. Hugo Hatch in [facility])  Lives with - Patient's Self Care Capacity: Spouse  Patient or legal guardian wants to designate a caregiver (see row info): Yes  Caregiver name:  (Kaylah Santamaria)  Caregiver relationship to patient:  (spouse)  Caregiver contact info: [phone]  Support Systems: Family Member(s)  Housing / Facility: 2 Story House  Do You Take your Prescribed Medications Regularly: Yes  Able to Return to Previous ADL's: Yes  Mobility Issues: No  Prior Services: None  Patient Expects to be Discharged to::  (home)  Assistance Needed: No  Durable Medical Equipment: Not Applicable (use to use CPAP but quit using it, interfered with sleep)    Discharge Preparedness  What is your plan after discharge?:  (home)  What are your discharge supports?: Spouse         Finances  Financial Barriers to Discharge: No  Prescription Coverage: Yes    Vision / Hearing Impairment  Vision Impairment : No  Hearing Impairment : No    Values / Beliefs / Concerns  Values / Beliefs Concerns : No    Advance Directive  Advance Directive?: Living Will, DPOA for Health Care  Durable Power of  Name and Contact :  (Kaylah Santamaria  [phone])    Domestic Abuse  Have you ever been the victim of abuse or violence?: No    Psychological Assessment  History of Substance Abuse: None  History of Psychiatric Problems: Yes (depression)    Discharge Risks or Barriers  Discharge risks or barriers?: No    Anticipated Discharge Information  Anticipated discharge disposition:  Home

## 2017-03-04 NOTE — ED PROVIDER NOTES
"CHIEF COMPLAINT  Chief Complaint   Patient presents with   • Slurred Speech   • Altered Mental Status       HPI  Rosalee Santamaria is a 61 y.o. male who presents with acute onset altered mental status at approximately 3:50 PM today. He recently drove from [facility] with his family to the [facility]. Was found to have difficulty with word finding and slight confusion. Wife at bedside reports that he does not appear to be himself. Brought to [facility] urgent care and was sent here for further evaluation.    The patient denies any headache, nausea, vomiting, neck pain, recent illness or fevers. Glucose prior to arrival was normal. Patient reports a history of hypertension however denies any history of stroke, diabetes, known cardiovascular disease. No history of smoking. No drug abuse history. No new medications.    REVIEW OF SYSTEMS  See HPI for further details. All other systems are negative.     PAST MEDICAL HISTORY   has a past medical history of Hypertension; Depression; and Hyperlipemia.    SOCIAL HISTORY  Social History     Social History Main Topics   • Smoking status: Former Smoker     Types: Cigarettes   • Smokeless tobacco: Not on file   • Alcohol Use: No   • Drug Use: No   • Sexual Activity: Not on file       SURGICAL HISTORY   has past surgical history that includes other orthopedic surgery.    CURRENT MEDICATIONS  Home Medications     **Home medications have not yet been reviewed for this encounter**          ALLERGIES  No Known Allergies    PHYSICAL EXAM  VITAL SIGNS: /83 mmHg  Pulse 78  Temp(Src) 36 °C (96.8 °F)  Resp 18  Ht 1.854 m (6' 1\")  Wt 79.379 kg (175 lb)  BMI 23.09 kg/m2    Pulse ox interpretation: I interpret this pulse ox as normal.  Constitutional: Alert in no apparent distress.  HENT: No signs of trauma, Bilateral external ears normal, Nose normal.   Eyes: Pupils are equal and reactive, Conjunctiva normal, Non-icteric.   Neck: Normal range of motion, No " tenderness, Supple, No stridor.   Cardiovascular: Regular rate and rhythm, no murmurs.   Thorax & Lungs: Normal breath sounds, No respiratory distress, No wheezing, No chest tenderness.   Abdomen: Bowel sounds normal, Soft, No tenderness, No masses, No pulsatile masses. No peritoneal signs.  Skin: Warm, Dry, No erythema, No rash.   Back: No bony tenderness, No CVA tenderness.   Extremities: Intact distal pulses, No edema, No tenderness, No cyanosis  Musculoskeletal: Good range of motion in all major joints. No tenderness to palpation or major deformities noted.   Neurologic: Alert, cranial nerves II through XII grossly intact, Normal motor function and gait, Normal sensory function, normal gaze, normal coordination, when performing an NIH stroke scale, the patient had a score of 1 due to mild to moderate aphasia. Had garbled language when saying phrases and difficulty identifying objects.  Psychiatric: Affect normal, Judgment normal, Mood normal.     DIAGNOSTIC STUDIES / PROCEDURES    EKG  3/3/2017 at 6:10 PM  Normal sinus rhythm at 60  CT, QRS, QTC within normal limits  T-wave inversions in lead III, aVF, V4, V5    LABS  Labs Reviewed   COMP METABOLIC PANEL    Narrative:     Indicate which anticoagulants the patient is on:->UNKNOWN   PROTHROMBIN TIME    Narrative:     Indicate which anticoagulants the patient is on:->UNKNOWN   APTT    Narrative:     Indicate which anticoagulants the patient is on:->UNKNOWN   COD (ADULT)   TROPONIN    Narrative:     Indicate which anticoagulants the patient is on:->UNKNOWN   URINALYSIS,CULTURE IF INDICATED   CBC WITH DIFFERENTIAL     RADIOLOGY  CT-CTA HEAD WITH & W/O-POST PROCESS   Final Result      CT angiogram of the [place] within normal limits.      CT-CTA NECK WITH & W/O-POST PROCESSING   Final Result      CT angiogram of the neck within normal limits.      CT-HEAD W/O   Final Result      1.  White matter lucencies most consistent with small vessel ischemic change  versus demyelination or gliosis.      2.  Otherwise, Head CT without contrast with no acute findings. No evidence of acute cerebral infarction, hemorrhage or mass lesion.      DX-CHEST-PORTABLE (1 VIEW)   Final Result      No radiographic evidence of acute cardiopulmonary process.      Costophrenic sulci are excluded from the study      CT-CTA HEAD WITH & W/O-POST PROCESS    (Results Pending)       COURSE & MEDICAL DECISION MAKING  Pertinent Labs & Imaging studies reviewed. (See chart for details)  61-year-old male with a history of hypertension presenting with sudden onset aphasia starting at approximately 3:50 PM. He felt very confused. Cannot find correct words. NIH stroke scale of 1 given aphasia. Had difficulty reading and identifying objects. Stroke protocol was initiated upon my evaluation of the patient. CT was performed showing no acute findings however there were white matter lucencies consistent with small vessel ischemic changes versus demyelination or gliosis. CTA of the head and neck were unremarkable.    Incidentally laboratory findings were unremarkable.    The total critical care time on this patient is 35 minutes, resuscitating patient, speaking with admitting physician, and deciphering test results. This 35 minutes is exclusive of separately billable procedures.    Spoke with Neurology, Dr Adler, at 6:24 PM and they will see the pt at bedside.  The time the patient was evaluated by neurology, the patient had resolution of his symptoms. By the time the patient was evaluated by neurology he had resolution of his symptoms.    Given the patient's resolution of symptoms, more consistent with TIA. However cannot rule out other intracranial process at this time. Will be admitted for further evaluation. The patient does not appear to be a candidate for alteplase according to neurology given resolution of the patient's symptoms.    Patient was admitted to the [facility] service. Spoke with the internal medicine  team at 8:38 PM. They agreed to the admission. They were informed that the patient did not receive aspirin as of yet.    FINAL IMPRESSION  1. Aphasia    2. Other specified transient cerebral ischemias            Electronically signed by: Mg Davis, 3/3/2017 5:57 PM

## 2017-03-04 NOTE — CONSULTS
"DATE OF SERVICE:  03/03/2017    REFERRING PHYSICIAN:  Dr. Mg Davis.    REFERRAL REASON:  Possible stroke.    HISTORY OF PRESENT ILLNESS:  The patient is a 61-year-old man, who [facility] today.  When he arrived at the   [facility] at about 3:30 p.m., he got out of the car and went up and   down the stairs a few times.  He then noticed difficulty expressing his   thoughts.  His speech was garbled and gibberish.  He was ultimately   transported here to the [facility] emergency room, where I was able to assess him   at about 6:30 p.m.  At that point in time, his speech symptoms had completely   resolved.  His NIH stroke scale score was 0 and he had no focal findings.    He denies any previous symptoms similar to this in the past.  He has no   previous history of stroke or TIA.  He denies any numbness, tingling,   weakness, or vision changes.  There is no focal weakness or facial droop   during the time he experiences symptoms.  The patient had a mild headache, but   this seemed to be gotten well after the symptoms started.  He had no recent   head injuries or traumas.    PAST MEDICAL HISTORY:  His past medical history is significant for   hypertension, hyperlipidemia, and depression.    HOME MEDICATIONS:  His home medications are:  1. Remeron.  2. Atorvastatin.  3. Lisinopril.  4. Amitriptyline.  5. Aspirin 81 mg daily.    ALLERGIES:  He denies any drug allergies.    SOCIAL HISTORY:  The patient is working with a  company.  He   denies tobacco or drug use.  He drinks alcohol in \"moderate\" amounts.  He is    and lives [place] [facility] [place].    FAMILY HISTORY:  Positive for a mother who had some mini strokes.    REVIEW OF SYSTEMS:  Otherwise negative except for as mentioned above.    PHYSICAL EXAMINATION:  VITAL SIGNS:  His temperature was 36 degrees Celsius with a heart rate of 65   and a blood pressure of 154/94.  His NIH stroke scale " score was 0.  GENERAL:  The patient was sitting in the ER stretcher in no apparent distress.  MENTAL STATUS:  He is awake, alert, and oriented x3 with fluent speech.  His   comprehension is intact.  His naming and repetition are intact.  CRANIAL NERVES:  His pupils were equal, round, and reactive to light.  His   extraocular movements are intact with no nystagmus.  His face is symmetric.    His facial sensation is intact.  Tongue is in the midline.  MOTOR:  5/5 throughout with no drift.  COORDINATION:  Normal finger-to-nose and heel-to-shin bilaterally.  SENSATION:  Intact to light touch throughout.  GAIT:  Deferred.    LABORATORY DATA:  He had a white blood cell count of 6.9, hemoglobin 15.3, and   platelets of 247,000.  Sodium 141, potassium 4.0, BUN of 19, and creatinine   of 1.13.  INR is 0.94.  Urinalysis was negative.    IMPRESSION:  The patient is a 61-year-old man, who presented with garbled   speech and this sounds like an expressive aphasia with almost 3 hours'   duration.  It was concerning for a high risk transient ischemic attack.  He is   not a tissue plasminogen activator candidate due to complete resolution of   symptoms at this time.    PLAN:  1. I will get a CT and CT angio of the head and neck while in the emergency   room.  2. He should be n.p.o. until cleared by swallow study.  3. I would admit him for further observation and TIA evaluation.  4. I would like an MRI of the brain without contrast to evaluate for an   infarct given the duration of the symptoms.  5. An echocardiogram to rule out cardioembolic source.       ____________________________________     MD DILMA Cardoza / ROSAMARIA    DD:  03/03/2017 19:21:33  DT:  03/03/2017 19:59:01    D#:  [id number]  Job#:  [id number]

## 2017-03-04 NOTE — H&P
[facility] Admitting History and Physical    Name Rosalee Santamaria     1955   Age/Sex 61 y.o. male   MRN [id number]   Code Status Full code     After 5PM or if no immediate response to page, please call for cross-coverage  Attending/Team: Dr. Menezes/ [place] team Call [phone] to page   1st Call - Day Intern (R1):   Dr. Kennedy 2nd Call - Day Sr. Resident (R2/R3):   Dr. Leiva        Chief Complaint:  Slurred speech and confusion     HPI:  Patient is a pleasant 61 year old with a past medical history of hypertension and dyslipidemia who presents today for the complaints listed above. He first experienced these symptoms around 3:50 PM today. He first noticed difficulty in finding words and slight confusion associated with anxiety. His symptoms were progressive gradual and  Declined with complete resolution to its current state now. Symptoms lasted for more than 1 hour. Blood pressures elevated at 160s /80s. Has long-standing history of hypertension and dyslipidemia however currently denies any diabetes. States he took his aspirin 81 in the morning and is compliant with his medications.   Denied any motor weakness, sensory paresthesias, loss of bowel and bladder continence, dizziness, loss of consciousness.    Review of Systems   Constitutional: Negative for fever and chills.   Eyes: Negative for blurred vision.   Respiratory: Negative for cough and shortness of breath.    Cardiovascular: Negative for chest pain and palpitations.   Gastrointestinal: Negative for nausea, vomiting, abdominal pain and diarrhea.   Genitourinary: Negative for dysuria.   Musculoskeletal: Negative for myalgias.   Skin: Negative for rash.   Neurological: Positive for speech change and headaches. Negative for dizziness, sensory change, focal weakness and loss of consciousness.   Psychiatric/Behavioral: Negative for substance abuse. The patient is nervous/anxious.              Past Medical History:   Past Medical  "History   Diagnosis Date   • Hypertension    • Depression    • Hyperlipemia        Past Surgical History:  Past Surgical History   Procedure Laterality Date   • Other orthopedic surgery         Current Outpatient Medications:  Home Medications     Reviewed by Guicho Kearney (Pharmacy Tech) on 03/03/17 at 1954  Med List Status: Complete    Medication Last Dose Status    aspirin EC (ECOTRIN) 81 MG Tablet Delayed Response 3/3/2017 Active    atorvastatin (LIPITOR) 40 MG Tab 3/3/2017 Active    calcium citrate (CALCITRATE) 950 MG Tab 3/3/2017 Active    losartan (COZAAR) 50 MG Tab 3/3/2017 Active    mirtazapine (REMERON) 45 MG tablet 3/2/2017 Active    Multiple Vitamins-Minerals (OCUVITE) Tab 3/3/2017 Active                Medication Allergy/Sensitivities:  No Known Allergies      Family History:  History reviewed. No pertinent family history.    Social History:  Social History     Social History   • Marital Status:      Spouse Name: N/A   • Number of Children: N/A   • Years of Education: N/A     Occupational History   • Not on file.     Social History Main Topics   • Smoking status: Former Smoker     Types: Cigarettes   • Smokeless tobacco: Not on file   • Alcohol Use: No   • Drug Use: No   • Sexual Activity: Not on file     Other Topics Concern   • Not on file     Social History Narrative   • No narrative on file     Living situation:  Lives with his wife  PCP : Pcp Not In Computer        Physical Exam     Filed Vitals:    03/03/17 1751 03/03/17 1830 03/03/17 1900   BP: 169/83     Pulse: 78 65 63   Temp: 36 °C (96.8 °F)     Resp: 18 18 18   Height: 1.854 m (6' 1\")     Weight: 79.379 kg (175 lb)     SpO2:  97% 97%     Body mass index is 23.09 kg/(m^2).  /83 mmHg  Pulse 63  Temp(Src) 36 °C (96.8 °F)  Resp 18  Ht 1.854 m (6' 1\")  Wt 79.379 kg (175 lb)  BMI 23.09 kg/m2  SpO2 97%  O2 therapy: Pulse Oximetry: 97 %, O2 Delivery: None (Room Air)    Physical Exam   Constitutional: He is oriented to " person, place, and time. No distress.   Elderly male, lying supine, in no apparent distress, nondiaphoretic, alert and oriented to time place and person, and is cooperative.   HENT:   Head: Normocephalic and atraumatic.   Cardiovascular: Normal rate, regular rhythm, normal heart sounds and intact distal pulses.  Exam reveals no gallop and no friction rub.    No murmur heard.  Regular rate and rhythm   Pulmonary/Chest: Effort normal and breath sounds normal. No respiratory distress. He has no wheezes. He has no rales.   Abdominal: Soft. Bowel sounds are normal. He exhibits no distension. There is no tenderness. There is no rebound.   Musculoskeletal: Normal range of motion. He exhibits no edema.   Intact peripheral pulses   Neurological: He is alert and oriented to person, place, and time. No cranial nerve deficit. He exhibits normal muscle tone. Coordination normal. GCS score is 15.   Motor 5/5 in all extremities  Normal speech  Normal affect  Cranial nerves 2-12 are within normal limits   Skin: Skin is warm and dry. He is not diaphoretic.   Psychiatric: Mood and affect normal.             Data Review       Old Records Request:   Completed  Current Records review and summary: Completed    Lab Data Review:  Recent Results (from the past 24 hour(s))   CBC WITH DIFFERENTIAL    Collection Time: 03/03/17  6:00 PM   Result Value Ref Range    WBC 6.9 4.8 - 10.8 K/uL    RBC 5.45 4.70 - 6.10 M/uL    Hemoglobin 15.3 14.0 - 18.0 g/dL    Hematocrit 45.6 42.0 - 52.0 %    MCV 83.7 81.4 - 97.8 fL    MCH 28.1 27.0 - 33.0 pg    MCHC 33.6 (L) 33.7 - 35.3 g/dL    RDW 38.1 35.9 - 50.0 fL    Platelet Count 247 164 - 446 K/uL    MPV 9.2 9.0 - 12.9 fL    Neutrophils-Polys 59.70 44.00 - 72.00 %    Lymphocytes 26.80 22.00 - 41.00 %    Monocytes 9.20 0.00 - 13.40 %    Eosinophils 3.10 0.00 - 6.90 %    Basophils 0.90 0.00 - 1.80 %    Immature Granulocytes 0.30 0.00 - 0.90 %    Nucleated RBC 0.00 /100 WBC    Neutrophils (Absolute) 4.11 1.82 -  7.42 K/uL    Lymphs (Absolute) 1.84 1.00 - 4.80 K/uL    Monos (Absolute) 0.63 0.00 - 0.85 K/uL    Eos (Absolute) 0.21 0.00 - 0.51 K/uL    Baso (Absolute) 0.06 0.00 - 0.12 K/uL    Immature Granulocytes (abs) 0.02 0.00 - 0.11 K/uL    NRBC (Absolute) 0.00 K/uL   COMP METABOLIC PANEL    Collection Time: 17  6:00 PM   Result Value Ref Range    Sodium 141 135 - 145 mmol/L    Potassium 4.0 3.6 - 5.5 mmol/L    Chloride 106 96 - 112 mmol/L    Co2 29 20 - 33 mmol/L    Anion Gap 6.0 0.0 - 11.9    Glucose 93 65 - 99 mg/dL    Bun 19 8 - 22 mg/dL    Creatinine 1.13 0.50 - 1.40 mg/dL    Calcium 9.7 8.5 - 10.5 mg/dL    AST(SGOT) 21 12 - 45 U/L    ALT(SGPT) 20 2 - 50 U/L    Alkaline Phosphatase 65 30 - 99 U/L    Total Bilirubin 0.6 0.1 - 1.5 mg/dL    Albumin 4.2 3.2 - 4.9 g/dL    Total Protein 7.1 6.0 - 8.2 g/dL    Globulin 2.9 1.9 - 3.5 g/dL    A-G Ratio 1.4 g/dL   PROTHROMBIN TIME    Collection Time: 17  6:00 PM   Result Value Ref Range    PT 12.9 12.0 - 14.6 sec    INR 0.94 0.87 - 1.13   APTT    Collection Time: 17  6:00 PM   Result Value Ref Range    APTT 26.2 24.7 - 36.0 sec   COD (ADULT)    Collection Time: 17  6:00 PM   Result Value Ref Range    ABO Grouping Only A     Rh Grouping Only POS     Antibody Screen-Cod NEG    TROPONIN    Collection Time: 17  6:00 PM   Result Value Ref Range    Troponin I <0.01 0.00 - 0.04 ng/mL   ESTIMATED GFR    Collection Time: 17  6:00 PM   Result Value Ref Range    GFR If African American >60 >60 mL/min/1.73 m 2    GFR If Non African American >60 >60 mL/min/1.73 m 2   EKG (NOW)    Collection Time: 17  6:10 PM   Result Value Ref Range    Report       [facility] Emergency Dept.    Test Date:  2017  Pt Name:    MERRICK RAMOS                   Department: ER  MRN:        [id number]                      Room:        02  Gender:     M                            Technician: [id number]  :        1955                   Requested By:ER  TRIAGE PROTOCOL  Order #:    [id number]                    Reading MD:    Measurements  Intervals                                Axis  Rate:       60                           P:          72  NY:         164                          QRS:        58  QRSD:       106                          T:          -38  QT:         440  QTc:        440    Interpretive Statements  SINUS RHYTHM  BORDERLINE INTRAVENTRICULAR CONDUCTION DELAY  RSR' IN V1 OR V2, RIGHT VCD OR RVH  ABNORMAL T, CONSIDER ISCHEMIA, DIFFUSE LEADS  No previous ECG available for comparison     URINALYSIS CULTURE, IF INDICATED    Collection Time: 03/03/17  6:13 PM   Result Value Ref Range    Color Colorless     Character Clear     Specific Gravity 1.006 <1.035    Ph 7.0 5.0-8.0    Glucose Negative Negative mg/dL    Ketones Negative Negative mg/dL    Protein Negative Negative mg/dL    Bilirubin Negative Negative    Nitrite Negative Negative    Leukocyte Esterase Negative Negative    Occult Blood Negative Negative    Micro Urine Req see below     Culture Indicated No UA Culture   ABO CONFIRMATION    Collection Time: 03/03/17  7:43 PM   Result Value Ref Range    Second ABO Typing With Cod A        Imaging/Procedures Review:    ndependant Imaging Review: Completed  CT-CTA HEAD WITH & W/O-POST PROCESS   Final Result      CT angiogram of the [place] within normal limits.      CT-CTA NECK WITH & W/O-POST PROCESSING   Final Result      CT angiogram of the neck within normal limits.      CT-HEAD W/O   Final Result      1.  White matter lucencies most consistent with small vessel ischemic change versus demyelination or gliosis.      2.  Otherwise, Head CT without contrast with no acute findings. No evidence of acute cerebral infarction, hemorrhage or mass lesion.      DX-CHEST-PORTABLE (1 VIEW)   Final Result      No radiographic evidence of acute cardiopulmonary process.      Costophrenic sulci are excluded from the study      CT-CTA HEAD WITH & W/O-POST PROCESS     (Results Pending)   MR-BRAIN-W/O    (Results Pending)   ECHOCARDIOGRAM COMP W/O CONT    (Results Pending)            EKG:   EKG Independant Review: Completed  QTc:440, HR: 60, Normal Sinus Rhythm, no ST/T changes     () Records reviewed and summarized in current documentation             Assessment/Plan       //Transient ischemic attack  -As per the patient, he noticed gradual increase in aphasia progressive in nature, and was associated with progressive decline to his normal mental status and speech. Symptoms lasted for more than 1 hour  -  significant improvement after coming to the ED supports TIA  -  ABCD 2 score is 5 points, moderate risk (age greater than 60, hypertension, slurred speech, duration of symptoms more than 60 minutes)  - Seen by neurology, Dr. Adler, was recommended TIA workup  - CT head is negative for hemorrhage or obvious infarction. CTA neck and head are negative for stenosis  - MRI brain pending  - Echo pending  PLAN  - Aspirin 325 once  - Continue aspirin 81, continue Lipitor 40   -Diet order post dysphagia screen by RN  - PT/OT consult placed  - Q4 hr neuro checks       //Mood disorder  - continue mirtazipine      No new assessment & plan notes have been filed under this hospital service since the last note was generated.  Service: MEDICAL      Anticipated Hospital stay: Observation admit        Quality Measures  Core Measures  DVT prophylaxis: Lovenox  Ulcer prophylaxis: None  No Maguire's

## 2017-03-05 NOTE — DISCHARGE INSTRUCTIONS
Discharge Instructions    Discharged to home by car with relative. Discharged via wheelchair, hospital escort: Refused.  Special equipment needed: Not Applicable    Be sure to schedule a follow-up appointment with your primary care doctor or any specialists as instructed.     Discharge Plan:   Diet Plan: Discussed  Activity Level: Discussed  Confirmed Follow up Appointment: Patient to Call and Schedule Appointment  Confirmed Symptoms Management: Discussed  Medication Reconciliation Updated: Yes  Influenza Vaccine Indication: Not indicated: Previously immunized this influenza season and > 8 years of age    I understand that a diet low in cholesterol, fat, and sodium is recommended for good health. Unless I have been given specific instructions below for another diet, I accept this instruction as my diet prescription.   Other diet: heart healthy    Special Instructions: None    · Is patient discharged on Warfarin / Coumadin?   No     · Is patient Post Blood Transfusion?  No    Depression / Suicide Risk    As you are discharged from this [facility] facility, it is important to learn how to keep safe from harming yourself.    Recognize the warning signs:  · Abrupt changes in personality, positive or negative- including increase in energy   · Giving away possessions  · Change in eating patterns- significant weight changes-  positive or negative  · Change in sleeping patterns- unable to sleep or sleeping all the time   · Unwillingness or inability to communicate  · Depression  · Unusual sadness, discouragement and loneliness  · Talk of wanting to die  · Neglect of personal appearance   · Rebelliousness- reckless behavior  · Withdrawal from people/activities they love  · Confusion- inability to concentrate     If you or a loved one observes any of these behaviors or has concerns about self-harm, here's what you can do:  · Talk about it- your feelings and reasons for harming yourself  · Remove any means that you might  use to hurt yourself (examples: pills, rope, extension cords, firearm)  · Get professional help from the community (Mental Health, Substance Abuse, psychological counseling)  · Do not be alone:Call your Safe Contact- someone whom you trust who will be there for you.  · Call your local CRISIS HOTLINE 275-3039 or 735-009-2597  · Call your local Children's Mobile Crisis Response Team [place] (155) 564-9610 or www.[employer]  · Call the toll free National Suicide Prevention Hotlines   · National Suicide Prevention Lifeline 795-195-NWEB (2924)  · National Hope Line Network 800-SUICIDE (928-8087)

## 2017-03-05 NOTE — PROGRESS NOTES
IV Dc'd. Discharge instructions provided to patient. Pt verbalizes understanding. Pt states all questions have been answered. Copy of DC provided to patient. Signed copy in chart. 2 prescriptions provided to patient. Pt states all personal belongings are in possession.  Pt escorted off unit by tech  without incident.